# Patient Record
Sex: MALE | Race: WHITE
[De-identification: names, ages, dates, MRNs, and addresses within clinical notes are randomized per-mention and may not be internally consistent; named-entity substitution may affect disease eponyms.]

---

## 2020-06-17 ENCOUNTER — HOSPITAL ENCOUNTER (INPATIENT)
Dept: HOSPITAL 47 - EC | Age: 75
LOS: 2 days | Discharge: HOME | DRG: 282 | End: 2020-06-19
Attending: HOSPITALIST | Admitting: HOSPITALIST
Payer: MEDICARE

## 2020-06-17 DIAGNOSIS — Z87.891: ICD-10-CM

## 2020-06-17 DIAGNOSIS — Z79.82: ICD-10-CM

## 2020-06-17 DIAGNOSIS — Z87.01: ICD-10-CM

## 2020-06-17 DIAGNOSIS — E78.5: ICD-10-CM

## 2020-06-17 DIAGNOSIS — Z79.899: ICD-10-CM

## 2020-06-17 DIAGNOSIS — R51: ICD-10-CM

## 2020-06-17 DIAGNOSIS — Z95.1: ICD-10-CM

## 2020-06-17 DIAGNOSIS — Z82.49: ICD-10-CM

## 2020-06-17 DIAGNOSIS — I27.20: ICD-10-CM

## 2020-06-17 DIAGNOSIS — K21.9: ICD-10-CM

## 2020-06-17 DIAGNOSIS — Z83.3: ICD-10-CM

## 2020-06-17 DIAGNOSIS — I25.10: ICD-10-CM

## 2020-06-17 DIAGNOSIS — I21.4: Primary | ICD-10-CM

## 2020-06-17 DIAGNOSIS — J44.9: ICD-10-CM

## 2020-06-17 DIAGNOSIS — I08.1: ICD-10-CM

## 2020-06-17 DIAGNOSIS — Z11.59: ICD-10-CM

## 2020-06-17 DIAGNOSIS — I10: ICD-10-CM

## 2020-06-17 LAB
ALBUMIN SERPL-MCNC: 3.6 G/DL (ref 3.5–5)
ALP SERPL-CCNC: 43 U/L (ref 38–126)
ALT SERPL-CCNC: 25 U/L (ref 4–49)
ANION GAP SERPL CALC-SCNC: 6 MMOL/L
APTT BLD: 22 SEC (ref 22–30)
AST SERPL-CCNC: 31 U/L (ref 17–59)
BASOPHILS # BLD AUTO: 0 K/UL (ref 0–0.2)
BASOPHILS NFR BLD AUTO: 0 %
BUN SERPL-SCNC: 17 MG/DL (ref 9–20)
CALCIUM SPEC-MCNC: 8.7 MG/DL (ref 8.4–10.2)
CHLORIDE SERPL-SCNC: 107 MMOL/L (ref 98–107)
CK SERPL-CCNC: 173 U/L (ref 55–170)
CO2 SERPL-SCNC: 23 MMOL/L (ref 22–30)
D DIMER PPP FEU-MCNC: 0.72 MG/L FEU (ref ?–0.6)
EOSINOPHIL # BLD AUTO: 0.2 K/UL (ref 0–0.7)
EOSINOPHIL NFR BLD AUTO: 2 %
ERYTHROCYTE [DISTWIDTH] IN BLOOD BY AUTOMATED COUNT: 4.78 M/UL (ref 4.3–5.9)
ERYTHROCYTE [DISTWIDTH] IN BLOOD: 13.8 % (ref 11.5–15.5)
GLUCOSE SERPL-MCNC: 136 MG/DL (ref 74–99)
HCT VFR BLD AUTO: 45.4 % (ref 39–53)
HGB BLD-MCNC: 16.1 GM/DL (ref 13–17.5)
INR PPP: 0.9 (ref ?–1.2)
LYMPHOCYTES # SPEC AUTO: 1 K/UL (ref 1–4.8)
LYMPHOCYTES NFR SPEC AUTO: 14 %
MAGNESIUM SPEC-SCNC: 2 MG/DL (ref 1.6–2.3)
MCH RBC QN AUTO: 33.6 PG (ref 25–35)
MCHC RBC AUTO-ENTMCNC: 35.4 G/DL (ref 31–37)
MCV RBC AUTO: 95 FL (ref 80–100)
MONOCYTES # BLD AUTO: 0.5 K/UL (ref 0–1)
MONOCYTES NFR BLD AUTO: 6 %
NEUTROPHILS # BLD AUTO: 5.6 K/UL (ref 1.3–7.7)
NEUTROPHILS NFR BLD AUTO: 75 %
PLATELET # BLD AUTO: 171 K/UL (ref 150–450)
PLATELET # BLD AUTO: 176 K/UL (ref 150–450)
POTASSIUM SERPL-SCNC: 3.9 MMOL/L (ref 3.5–5.1)
PROT SERPL-MCNC: 6.4 G/DL (ref 6.3–8.2)
PT BLD: 9.7 SEC (ref 9–12)
SODIUM SERPL-SCNC: 136 MMOL/L (ref 137–145)
WBC # BLD AUTO: 7.4 K/UL (ref 3.8–10.6)

## 2020-06-17 PROCEDURE — 93306 TTE W/DOPPLER COMPLETE: CPT

## 2020-06-17 PROCEDURE — 83735 ASSAY OF MAGNESIUM: CPT

## 2020-06-17 PROCEDURE — 85025 COMPLETE CBC W/AUTO DIFF WBC: CPT

## 2020-06-17 PROCEDURE — 80061 LIPID PANEL: CPT

## 2020-06-17 PROCEDURE — 80048 BASIC METABOLIC PNL TOTAL CA: CPT

## 2020-06-17 PROCEDURE — 96376 TX/PRO/DX INJ SAME DRUG ADON: CPT

## 2020-06-17 PROCEDURE — 96375 TX/PRO/DX INJ NEW DRUG ADDON: CPT

## 2020-06-17 PROCEDURE — 99285 EMERGENCY DEPT VISIT HI MDM: CPT

## 2020-06-17 PROCEDURE — 85730 THROMBOPLASTIN TIME PARTIAL: CPT

## 2020-06-17 PROCEDURE — 80053 COMPREHEN METABOLIC PANEL: CPT

## 2020-06-17 PROCEDURE — 93005 ELECTROCARDIOGRAM TRACING: CPT

## 2020-06-17 PROCEDURE — 96366 THER/PROPH/DIAG IV INF ADDON: CPT

## 2020-06-17 PROCEDURE — 83690 ASSAY OF LIPASE: CPT

## 2020-06-17 PROCEDURE — 84484 ASSAY OF TROPONIN QUANT: CPT

## 2020-06-17 PROCEDURE — 71275 CT ANGIOGRAPHY CHEST: CPT

## 2020-06-17 PROCEDURE — 85049 AUTOMATED PLATELET COUNT: CPT

## 2020-06-17 PROCEDURE — 82550 ASSAY OF CK (CPK): CPT

## 2020-06-17 PROCEDURE — 71046 X-RAY EXAM CHEST 2 VIEWS: CPT

## 2020-06-17 PROCEDURE — 85379 FIBRIN DEGRADATION QUANT: CPT

## 2020-06-17 PROCEDURE — 85610 PROTHROMBIN TIME: CPT

## 2020-06-17 PROCEDURE — 93458 L HRT ARTERY/VENTRICLE ANGIO: CPT

## 2020-06-17 PROCEDURE — 90732 PPSV23 VACC 2 YRS+ SUBQ/IM: CPT

## 2020-06-17 PROCEDURE — 36415 COLL VENOUS BLD VENIPUNCTURE: CPT

## 2020-06-17 PROCEDURE — 70450 CT HEAD/BRAIN W/O DYE: CPT

## 2020-06-17 PROCEDURE — 83880 ASSAY OF NATRIURETIC PEPTIDE: CPT

## 2020-06-17 PROCEDURE — 96365 THER/PROPH/DIAG IV INF INIT: CPT

## 2020-06-17 RX ADMIN — ATORVASTATIN CALCIUM SCH MG: 80 TABLET, FILM COATED ORAL at 13:20

## 2020-06-17 RX ADMIN — METOPROLOL TARTRATE SCH MG: 50 TABLET, FILM COATED ORAL at 09:43

## 2020-06-17 RX ADMIN — METOPROLOL TARTRATE SCH MG: 50 TABLET, FILM COATED ORAL at 20:11

## 2020-06-17 RX ADMIN — LOSARTAN POTASSIUM SCH MG: 25 TABLET, FILM COATED ORAL at 20:11

## 2020-06-17 RX ADMIN — HEPARIN SODIUM SCH MLS/HR: 10000 INJECTION, SOLUTION INTRAVENOUS at 07:25

## 2020-06-17 NOTE — CT
EXAM:

  CT Head Without Intravenous Contrast

 

CLINICAL HISTORY:

  ITS.REASON CT Reason: ha

 

TECHNIQUE:

  Axial computed tomography images of the head/brain without intravenous 

contrast.  CTDI is 49 mGy and DLP is 1123 mGy-cm.  This CT exam was 

performed using one or more of the following dose reduction techniques: 

automated exposure control, adjustment of the mA and/or kV according to 

patient size, and/or use of iterative reconstruction technique.

 

COMPARISON:

  No relevant prior studies available.

 

FINDINGS:

  Brain:  No hemorrhage or mass effect.

  Ventricles:  No hydrocephalus.  Mild cerebral volume loss.

  Bones/joints:  Unremarkable.

  Soft tissues:  Unremarkable.

  Sinuses:  Unremarkable.

  Mastoid air cells:  Clear.

 

IMPRESSION:     

  No acute hemorrhage, hydrocephalus, or mass effect.

## 2020-06-17 NOTE — P.CRDCN
History of Present Illness


Consult date: 20


Requesting physician: Ty Wilder


Consult reason: non-Q-wave MI


Chief complaint: Chest pain


History of present illness: 


This is a 75-year-old  gentleman who follows with Dr. CHRISTIANA Montoya in the 

NECK OFFICE.  HE HAS A HISTORY OF CORONARY ARTERY DISEASE WITH PRIOR CORONARY 

ARTERY BYPASS GRAFTING SURGERY IN , HE UNDERWENT BYPASS SURGERY WITH A LIMA 

TO THE SECOND OBTUSE MARGINAL, VEIN GRAFT TO THE PDA BRANCH AND ANOTHER VEIN 

GRAFT TO THE PLV BRANCH OF THE RIGHT CORONARY ARTERY.  THE LAD WAS SMALL.  The 

patient also has a history of hypertension, hyperlipidemia, nicotine dependence,

family history of premature coronary artery disease.  He presents to the 

hospital on this occasion with fairly sudden onset of shortness of breath with 

seated chest pressure and heaviness, he states he felt like someone heavy was 

sitting on his chest.  He became quite clammy, also complained of a headache.  

Chest x-ray performed on arrival here showed some pleural and pulmonary scarring

to the left lung, no heart failure.  There is evidence of small bilateral 

pleural effusions.  EKG shows a sinus bradycardia with nonspecific ST-T wave 

changes noted in the lateral leads.  CTA of the chest negative for pulmonary 

embolism, patchy atelectasis and linear infiltrate at the lung bases with 

bilateral pleural effusions.  Nonspecific bronchial lymph nodes noted.  CAT scan

of the head did not reveal any acute hemorrhage, hydrocephalus, or mass effect. 

Blood pressure 170/90 with a heart rate in the 50s, 98% on 2 L of oxygen.  White

blood cell count 7.4, hemoglobin 16.1, platelet count 176.  Sodium 136, 

potassium 3.9, BUN 17, creatinine 0.9.  Magnesium 2.0.  D-dimer 0.72.  BNP 1450.

 Troponins 0.03, 0.05, 0.04.  At the time of my examination in the emergency 

room the patient is currently chest pain-free.








Past Medical History


Past Medical History: Coronary Artery Disease (CAD), Chest Pain / Angina, COPD, 

GERD/Reflux, Hyperlipidemia, Hypertension, Pneumonia


History of Any Multi-Drug Resistant Organisms: None Reported


Past Surgical History: Coronary Bypass/CABG, Heart Catheterization


Additional Past Surgical History / Comment(s):  triple bypass, R hand 

thumb/index and middle finger surgery twice d/t injury, nasal fracture with 

surgery, colonoscopy


Past Anesthesia/Blood Transfusion Reactions: No Reported Reaction


Smoking Status: Former smoker





- Past Family History


  ** Father


Family Medical History: Myocardial Infarction (MI)


Additional Family Medical History / Comment(s): Father had his first MI at the 

age of 47 and  from his 3rd MI at the age of 49.





  ** Mother


Family Medical History: Diabetes Mellitus


Additional Family Medical History / Comment(s): Tejar is .





Medications and Allergies


                                Home Medications











 Medication  Instructions  Recorded  Confirmed  Type


 


Aspirin [Adult Low Dose Aspirin EC] 81 mg PO DAILY 20 History


 


Cholecalciferol [Vitamin D3 (25 1,000 unit PO DAILY 20 History





Mcg = 1000 Iu)]    


 


Metoprolol Succinate (ER) [Toprol 100 mg PO HS 20 History





Xl]    


 


Potassium Chloride [Klor-Con 20] 20 meq PO DAILY 20 History


 


Pravastatin Sodium [Pravachol] 40 mg PO HS 20 History


 


hydrALAZINE HCL 50 mg PO BID 20 History








                                    Allergies











Allergy/AdvReac Type Severity Reaction Status Date / Time


 


No Known Allergies Allergy   Verified 20 07:53














Physical Exam


Vitals: 


                                   Vital Signs











  Temp Pulse Resp BP Pulse Ox


 


 20 09:42   56 L  18  170/90  98


 


 20 06:51   53 L  17  173/93  98


 


 20 06:03   55 L  17  177/90  99


 


 20 05:19   55 L  17  173/67  99


 


 20 04:45   53 L  17   99


 


 20 04:10   55 L  18  198/93  99


 


 20 03:45   64  17  171/84  99


 


 20 02:18  98.4 F  55 L  18  145/90  100








                                Intake and Output











 20





 22:59 06:59 14:59


 


Other:   


 


  Weight  86.636 kg 86.636 kg














PHYSICAL EXAMINATION: 





GENERAL: 75-year-old  gentleman in no acute distress at the time of my 

examination





HEENT: Head is atraumatic, normocephalic.  Pupils equal, round.  Sclera 

anicteric. Conjunctiva are clear.  Mucous membranes of the mouth are moist.  

Neck is supple.  There is no elevated jugular venous pressure.  No carotid  

bruit is heard.





HEART EXAMINATION: Heart S1, S2 normal.  No murmur or gallop heard.





CHEST EXAMINATION: Lungs reveal fine rales to the bases bilaterally





ABDOMEN:  Soft, nontender. Bowel sounds are heard. No organomegaly noted.


 


EXTREMITIES: 2+ peripheral pulses with no evidence of peripheral edema and no 

calf tenderness noted.





NEUROLOGIC patient is awake, alert and oriented 3 .


 


.


 








Results





                                 20 08:30





                                 20 02:27


                                 Cardiac Enzymes











  20 Range/Units





  02:27 02:27 05:17 


 


AST  31    (17-59)  U/L


 


Troponin I   0.033  0.051 H*  (0.000-0.034)  ng/mL














  20 Range/Units





  08:30 


 


AST   (17-59)  U/L


 


Troponin I  0.040 H*  (0.000-0.034)  ng/mL








                                   Coagulation











  20 Range/Units





  02:27 


 


PT  9.7  (9.0-12.0)  sec


 


APTT  22.0  (22.0-30.0)  sec








                                       CBC











  20 Range/Units





  02:27 08:30 


 


WBC  7.4   (3.8-10.6)  k/uL


 


RBC  4.78   (4.30-5.90)  m/uL


 


Hgb  16.1   (13.0-17.5)  gm/dL


 


Hct  45.4   (39.0-53.0)  %


 


Plt Count  176  171  (150-450)  k/uL








                          Comprehensive Metabolic Panel











  20 Range/Units





  02:27 


 


Sodium  136 L  (137-145)  mmol/L


 


Potassium  3.9  (3.5-5.1)  mmol/L


 


Chloride  107  ()  mmol/L


 


Carbon Dioxide  23  (22-30)  mmol/L


 


BUN  17  (9-20)  mg/dL


 


Creatinine  0.98  (0.66-1.25)  mg/dL


 


Glucose  136 H  (74-99)  mg/dL


 


Calcium  8.7  (8.4-10.2)  mg/dL


 


AST  31  (17-59)  U/L


 


ALT  25  (4-49)  U/L


 


Alkaline Phosphatase  43  ()  U/L


 


Total Protein  6.4  (6.3-8.2)  g/dL


 


Albumin  3.6  (3.5-5.0)  g/dL








                               Current Medications











Generic Name Dose Route Start Last Admin





  Trade Name Freq  PRN Reason Stop Dose Admin


 


Aspirin  81 mg  20 09:00 





  Aspirin  PO  





  DAILY [facility]  


 


Heparin Sodium (Porcine)  0 unit  20 04:10 





  Heparin  IV  





  Q6HR PRN  





  Low PTT  





  Protocol  


 


Heparin Sodium/Sodium Chloride  250 mls @ 9.53 mls/hr  20 04:15  20 

07:25





  25,000 unit/ Sodium Chloride  IV   11 units/kg/hr





  .Q24H VETO   9.53 mls/hr





    Administration





  Protocol  





  11 UNITS/KG/HR  


 


Losartan Potassium  25 mg  20 10:15 





  Cozaar  PO  





  DAILY [facility]  


 


Metoprolol Tartrate  50 mg  20 09:00  20 09:43





  Lopressor  PO   50 mg





  BID VETO   Administration


 


Nitroglycerin  0.4 mg  20 04:10 





  Nitrostat  SUBLINGUAL  





  Q5M PRN  





  Chest Pain  








                                Intake and Output











 20





 22:59 06:59 14:59


 


Other:   


 


  Weight  86.636 kg 86.636 kg








                                 Patient Weight











 20





 06:59


 


Weight 86.636 kg








                                        





                                 20 08:30 





                                 20 02:27 











EKG Interpretations (text)





EKG shows a sinus bradycardia with nonspecific ST-T wave changes noted in the 

lateral leads





Assessment and Plan


Plan: 


Assessment and plan


#1 symptoms of chest pressure and heaviness with associated shortness of breath 

and clamminess, clinical picture suggestive acute coronary syndrome.  EKG shows 

a sinus bradycardia with lateral ST-T wave changes.  Troponins 0.03, 0.05, 0.04.


#2 known history of coronary artery disease with prior bypass surgery in 


#3 hypertension


#4 hyperlipidemia


#5 family history of premature coronary artery disease 


#6 history of nicotine dependence





Plan


We will obtain an echocardiogram with Doppler study.  We will also add losartan 

to the patient's medication regime, to optimize blood pressure control.  

Continue aspirin, metoprolol, start the patient on a statin.  Patient has been 

advised to undergo cardiac catheterization, the risks and benefits were 

explained to the patient in detail.  Dr. Coates will speak with Dr. BRONWYN Montoya, we

will tentatively schedule patient for cardiac catheterization tomorrow.  Further

recommendations to follow.





DNP note has been reviewed, I agree with a documented findings and plan of care.

 Patient was seen and examined.

## 2020-06-17 NOTE — CT
EXAMINATION TYPE: CT angio chest

 

DATE OF EXAM: 6/17/2020

 

COMPARISON: None

 

HISTORY: Chest pain

 

CT DLP: 520.9 mGycm

Automated exposure control for dose reduction was used.

 

CONTRAST: 

Performed with IV Contrast, patient injected with 90 mL of Isovue 370.

 

There are 3-D post processed images.

 

There are mild to moderate bilateral pleural effusions. There is no pericardial effusion. There is so
me patchy atelectasis at the lung bases.

 

There is no mediastinal adenopathy. Thoracic aorta shows mild atheromatous change. There is no aneury
sm or dissection. There are bilateral bronchial lymph nodes that measure up to 1 cm.

 

There is normal contrast opacification of the pulmonary arteries. There are no filling defects. The b
mae thorax is intact. There are sternal wires. There is a 1 cm calcified gallstone. Bile ducts are no
t dilated. There is plaque formation in the lower abdominal aorta that measures up to 1 cm in thickne
ss. Abdominal aorta measures 2.8 cm.

 

IMPRESSION:

No evidence of pulmonary embolism. Patchy atelectasis and linear infiltrate at the lung bases with bi
lateral pleural effusions. Nonspecific bronchial lymph nodes.

## 2020-06-17 NOTE — P.HPIM
History of Present Illness


Patient is an 75-year-old gentleman came with complains of chest pain has been 

going on on and off for a while which is heaviness.  Patient just pain is 

nonpleuritic not associated with food has some associated shortness of breath de

nied any fever chills lightheadedness pain chest pain lasts anywhere between 1-3

hours episodic.  Patient had history of a previous CABG in the past in .  

Patient was evaluated by cardiology patient has minimally elevated troponins of 

0.04 and 0.05 and the recording cardiac catheterization same thing was discussed

the patient and patient is agreeable for cardiac catheterization CT angios the c

hest did not show any pulmonary embolism except for the bilateral pleural 

effusions.  Echocardiogram showed normal ejection fraction with some almost 

abdominal distress which can be older.  Patient had a CT brain CT which is did 

not show any acute abnormalities.  His BNP is only 40 and 50 and patient doesn't

have any elevated JVD





Review of Systems








REVIEW OF SYSTEMS: 


CONSTITUTIONAL: No fever, no malaise, no fatigue. 


HEENT: No recent visual problems or hearing problems. Denied any sore throat. 


CARDIOVASCULAR: No orthopnea, PND, no palpitations, no syncope. 


PULMONARY: No shortness of breath, no cough, no hemoptysis. 


GASTROINTESTINAL: No diarrhea, no nausea, no vomiting, no abdominal pain. 


NEUROLOGICAL: No headaches, no weakness, no numbness. 


HEMATOLOGICAL: Denies any bleeding or petechiae. 


GENITOURINARY: Denies any burning micturition, frequency, or urgency. 


MUSCULOSKELETAL/RHEUMATOLOGICAL: Denies any joint pain, swelling, or any muscle 

pain. 


ENDOCRINE: Denies any polyuria or polydipsia. 





The rest of the 14-point review of systems is negative.











Past Medical History


Past Medical History: Coronary Artery Disease (CAD), Chest Pain / Angina, COPD, 

GERD/Reflux, Hyperlipidemia, Hypertension, Pneumonia


History of Any Multi-Drug Resistant Organisms: None Reported


Past Surgical History: Coronary Bypass/CABG, Heart Catheterization


Additional Past Surgical History / Comment(s):  triple bypass, R hand 

thumb/index and middle finger surgery twice d/t injury, nasal fracture with 

surgery, colonoscopy


Past Anesthesia/Blood Transfusion Reactions: No Reported Reaction


Smoking Status: Former smoker





- Past Family History


  ** Father


Family Medical History: Myocardial Infarction (MI)


Additional Family Medical History / Comment(s): Father had his first MI at the 

age of 47 and  from his 3rd MI at the age of 49.





  ** Mother


Family Medical History: Diabetes Mellitus


Additional Family Medical History / Comment(s): Loulou is .





Medications and Allergies


                                Home Medications











 Medication  Instructions  Recorded  Confirmed  Type


 


Aspirin [Adult Low Dose Aspirin EC] 81 mg PO DAILY 20 History


 


Cholecalciferol [Vitamin D3 (25 1,000 unit PO DAILY 20 History





Mcg = 1000 Iu)]    


 


Metoprolol Succinate (ER) [Toprol 100 mg PO HS 20 History





Xl]    


 


Potassium Chloride [Klor-Con 20] 20 meq PO DAILY 20 History


 


Pravastatin Sodium [Pravachol] 40 mg PO HS 20 History


 


hydrALAZINE HCL 50 mg PO BID 20 History








                                    Allergies











Allergy/AdvReac Type Severity Reaction Status Date / Time


 


No Known Allergies Allergy   Verified 20 07:53














Physical Exam


Vitals: 


                                   Vital Signs











  Temp Pulse Resp BP Pulse Ox


 


 20 11:36  98.3 F  57 L  18  135/76  98


 


 20 09:42   56 L  18  170/90  98


 


 20 06:51   53 L  17  173/93  98


 


 20 06:03   55 L  17  177/90  99


 


 20 05:19   55 L  17  173/67  99


 


 20 04:45   53 L  17   99


 


 20 04:10   55 L  18  198/93  99


 


 20 03:45   64  17  171/84  99


 


 20 02:18  98.4 F  55 L  18  145/90  100








                                Intake and Output











 20





 22:59 06:59 14:59


 


Other:   


 


  Weight  86.636 kg 86.636 kg

















PHYSICAL EXAMINATION: 





GENERAL: The patient is alert and oriented x3, not in any acute distress. Well 

developed, well nourished. 


HEENT: Pupils are round and equally reacting to light. EOMI. No scleral icterus.

 No conjunctival pallor. Normocephalic, atraumatic. No pharyngeal erythema. No 

thyromegaly. 


CARDIOVASCULAR: S1 and S2 present. No murmurs, rubs, or gallops. 


PULMONARY: Chest is clear to auscultation, no wheezing or crackles. 


ABDOMEN: Soft, nontender, nondistended, normoactive bowel sounds. No palpable 

organomegaly. 


MUSCULOSKELETAL: No joint swelling or deformity.


EXTREMITIES: No cyanosis, clubbing, or pedal edema. 


NEUROLOGICAL: Gross neurological examination did not reveal any focal deficits. 


SKIN: No rashes. 

















Results


CBC & Chem 7: 


                                 20 08:30





                                 20 02:27


Labs: 


                  Abnormal Lab Results - Last 24 Hours (Table)











  20 Range/Units





  02:27 02:27 05:17 


 


D-Dimer  0.72 H    (<0.60)  mg/L FEU


 


Sodium   136 L   (137-145)  mmol/L


 


Glucose   136 H   (74-99)  mg/dL


 


Creatine Kinase   173 H   ()  U/L


 


Troponin I    0.051 H*  (0.000-0.034)  ng/mL














  20 Range/Units





  08:30 


 


D-Dimer   (<0.60)  mg/L FEU


 


Sodium   (137-145)  mmol/L


 


Glucose   (74-99)  mg/dL


 


Creatine Kinase   ()  U/L


 


Troponin I  0.040 H*  (0.000-0.034)  ng/mL














Thrombosis Risk Factor Assmnt





- Choose All That Apply


Any of the Below Risk Factors Present?: Yes


Each Factor Represents 1 point: Abnormal pulmonary function (COPD), Obesity (BMI

 >25)


Other Risk Factors: Yes


Each Risk Factor Represents 3 Points: Age 75 years or older


Other congenital or acquired thrombophilia - If yes, enter type in comment: No


Thrombosis Risk Factor Assessment Total Risk Factor Score: 5


Thrombosis Risk Factor Assessment Level: High Risk





Assessment and Plan


Plan: 





-Chest pain with mildly elevated troponins possibility of non-ST elevation 

myocardial infarction: Continue with heparin antiplatelet therapy beta blocker. 

 Will undergo cardiac catheterization tomorrow


-Coronary artery disease with history of CABG in the past 


- hypertension: Patient was started on ACE inhibitor for better blood pressure 

control by cardiology


- hyperlipidemia


-Ruled out PE and pneumonia


-Gastroesophageal reflux disease





For above-mentioned chronic problems patient will be resumed and continued on 

appropriate home medications

## 2020-06-17 NOTE — ECHOF
Referral Reason:assess lvf



MEASUREMENTS

--------

HEIGHT: 175.3 cm

WEIGHT: 86.6 kg

BP: 174/106

RVIDd:   3.1 cm     (< 3.3)

IVSd:   1.3 cm     (0.6 - 1.1)

LVIDd:   4.2 cm     (3.9 - 5.3)

LVPWd:   1.3 cm     (0.6 - 1.1)

IVSs:   1.7 cm

LVIDs:   3.0 cm

LVPWs:   2.0 cm

LA Diam:   3.7 cm     (2.7 - 3.8)

LAESV Index (A-L):   27.76 ml/m

Ao Diam:   3.3 cm     (2.0 - 3.7)

AV Cusp:   2.0 cm     (1.5 - 2.6)

MV EXCURSION:   24.295 mm     (> 18.000)

MV EF SLOPE:   18 mm/s     (70 - 150)

EPSS:   0.2 cm

MV E Bret:   1.04 m/s

MV DecT:   193 ms

MV A Bret:   0.61 m/s

MV E/A Ratio:   1.71 

RAP:   5.00 mmHg

RVSP:   34.99 mmHg







FINDINGS

--------

This was a technically adequate study.

HX of CABG

The left ventricular size is normal.   There is mild concentric left ventricular hypertrophy.   Overa
ll left ventricular systolic function is low-normal with, an EF between 50 - 55 %.   Basal inferior L
V wall motion is hypokinetic.    Basal inferoseptal LV wall motion is hypokinetic.

The right ventricle is normal in size.

Normal LA  size by volume 22+/-6 ml/m2.

The right atrium is normal in size.

Interatrial and interventricular septum intact.

The aortic valve is trileaflet and appears structurally normal.

Mild mitral annular calcification present.

Mild tricuspid regurgitation present.   There is mild pulmonary hypertension.   The right ventricular
 systolic pressure, as measured by Doppler, is 34.99mmHg.

There is no pulmonic regurgitation present.

The aortic root size is normal.

IVC Not well visulized.

There is no pericardial effusion.



CONCLUSIONS

--------

1. This was a technically adequate study.

2. HX of CABG

3. The left ventricular size is normal.

4. There is mild concentric left ventricular hypertrophy.

5. Overall left ventricular systolic function is low-normal with, an EF between 50 - 55 %.

6. Basal inferior LV wall motion is hypokinetic.

7. Basal inferoseptal LV wall motion is hypokinetic.

8. The right ventricle is normal in size.

9. Normal LA size by volume 22+/-6 ml/m2.

10. The right atrium is normal in size.

11. Interatrial and interventricular septum intact.

12. The aortic valve is trileaflet and appears structurally normal.

13. Mild mitral annular calcification present.

14. Mild tricuspid regurgitation present.

15. There is mild pulmonary hypertension.

16. The right ventricular systolic pressure, as measured by Doppler, is 34.99mmHg.

17. There is no pulmonic regurgitation present.

18. The aortic root size is normal.

19. IVC Not well visulized.

20. There is no pericardial effusion.





SONOGRAPHER: Jasmin Stauffer RDCS

## 2020-06-17 NOTE — ED
Chest Pain HPI





- General


Chief Complaint: Chest Pain


Stated Complaint: Chest pain


Time Seen by Provider: 20 02:22


Source: EMS, RN notes reviewed, old records reviewed


Mode of arrival: EMS


Limitations: no limitations





- History of Present Illness


Initial Comments: 





This is a 75-year-old male DF for evaluation patient has history of heart 

disease CABG complaining of chest pain as well as dizziness vertiginous symptoms

weakness especially with position, these are symptoms similar prior MI with 

recalled required surgery.  Patient states his symptoms are improved is on is 

still currently.  No recent fevers cough or congestion no current headache


MD Complaint: chest pain


-: hour(s)


Onset: during rest, during exertion


Pain Location: substernal, left chest


Pain Radiation: LUE


Severity: mild


Severity scale (1-10): 3


Quality: heaviness


Consistency: intermittent


Improves With: nothing


Worsens With: nothing


Anginal Symptoms: nausea


Treatments Prior to Arrival: none





- Related Data


                                Home Medications











 Medication  Instructions  Recorded  Confirmed


 


Aspirin [Adult Low Dose Aspirin EC] 81 mg PO DAILY 20


 


Cholecalciferol [Vitamin D3 (25 1,000 unit PO DAILY 20





Mcg = 1000 Iu)]   


 


Pravastatin Sodium [Pravachol] 40 mg PO HS 20








                                  Previous Rx's











 Medication  Instructions  Recorded


 


ALPRAZolam [Xanax] 0.5 mg PO Q6HR PRN #12 tab 20


 


Acetaminophen Tab [Tylenol] 650 mg PO Q6HR PRN  tab 20


 


Isosorbide Mononitrate ER [Imdur] 30 mg PO DAILY 30 Days #30 20





 tab.er.24h 


 


Losartan [Cozaar] 50 mg PO BID 30 Days #60 tab 20


 


Metoprolol Tartrate [Lopressor] 50 mg PO BID 30 Days #60 tab 20


 


Nitroglycerin Sl Tabs [Nitrostat] 0.4 mg SUBLINGUAL Q5M PRN #30 tab 20


 


hydrALAZINE HCL 50 mg PO TID 30 Days #90 tab 20











                                    Allergies











Allergy/AdvReac Type Severity Reaction Status Date / Time


 


No Known Allergies Allergy   Verified 20 07:53














Review of Systems


ROS Statement: 


Those systems with pertinent positive or pertinent negative responses have been 

documented in the HPI.





ROS Other: All systems not noted in ROS Statement are negative.





EKG Findings





- EKG Comments:


EKG Findings:: EKG shows bradycardia 54, , QRS 78, .  EKG shows 

sinus bradycardia 54  QRS 80 





Past Medical History


Past Medical History: Chest Pain / Angina, Hypertension


History of Any Multi-Drug Resistant Organisms: None Reported


Past Surgical History: Coronary Bypass/CABG


Additional Past Surgical History / Comment(s): triple bypass


Past Psychological History: No Psychological Hx Reported


Smoking Status: Never smoker


Past Alcohol Use History: None Reported


Past Drug Use History: None Reported





- Past Family History


  ** Father


Family Medical History: Myocardial Infarction (MI)


Additional Family Medical History / Comment(s): Father had his first MI at the 

age of 47 and  from his 3rd MI at the age of 49.





  ** Mother


Family Medical History: Diabetes Mellitus


Additional Family Medical History / Comment(s): Loulou is .





General Exam


General appearance: alert, in no apparent distress


Head exam: Present: atraumatic, normocephalic, normal inspection


Eye exam: Present: normal appearance, PERRL, EOMI.  Absent: scleral icterus, 

conjunctival injection, periorbital swelling


ENT exam: Present: normal exam, mucous membranes moist


Neck exam: Present: normal inspection.  Absent: tenderness, meningismus, 

lymphadenopathy


Respiratory exam: Present: normal lung sounds bilaterally.  Absent: respiratory 

distress, wheezes, rales, rhonchi, stridor


Cardiovascular Exam: Present: regular rate, normal rhythm, normal heart sounds. 

 Absent: systolic murmur, diastolic murmur, rubs, gallop, clicks


GI/Abdominal exam: Present: soft, normal bowel sounds.  Absent: distended, 

tenderness, guarding, rebound, rigid


Extremities exam: Present: normal inspection, full ROM, normal capillary refill.

  Absent: tenderness, pedal edema, joint swelling, calf tenderness


Back exam: Present: normal inspection


Neurological exam: Present: alert, oriented X3, CN II-XII intact


Psychiatric exam: Present: normal affect, normal mood


Skin exam: Present: warm, dry, intact, normal color.  Absent: rash





Course


                                   Vital Signs











  20





  02:18 03:45 04:10


 


Temperature 98.4 F  


 


Pulse Rate 55 L 64 55 L


 


Respiratory 18 17 18





Rate   


 


Blood Pressure 145/90 171/84 198/93


 


O2 Sat by Pulse 100 99 99





Oximetry   














  20





  04:45 05:19 06:03


 


Temperature   


 


Pulse Rate 53 L 55 L 55 L


 


Respiratory 17 17 17





Rate   


 


Blood Pressure  173/67 177/90


 


O2 Sat by Pulse 99 99 99





Oximetry   














  20





  06:51 09:42 11:36


 


Temperature   98.3 F


 


Pulse Rate 53 L 56 L 57 L


 


Respiratory 17 18 18





Rate   


 


Blood Pressure 173/93 170/90 135/76


 


O2 Sat by Pulse 98 98 98





Oximetry   














- Reevaluation(s)


Reevaluation #1: 





Medical records reviewed





Patient does have continued bouts of dizziness here in the ER which again he 

relates a prior MI








Chest Pain MDM





- MDM





75 male DF for evaluation has chest pain and dizziness, vertiginous symptoms 

similar prior MI CT brain negative CT angios for PE is negative for acute 

disease patient can be admitted for cardiology observation





Critical Care Time


Critical Care Time: Yes


Total Critical Care Time: 31





Disposition


Clinical Impression: 


 Chest pain





Disposition: ADMITTED AS IP TO THIS HOSP


Condition: Stable


Is patient prescribed a controlled substance at d/c from ED?: No

## 2020-06-17 NOTE — XR
EXAMINATION TYPE: XR chest 2V

 

DATE OF EXAM: 6/17/2020

 

COMPARISON: NONE

 

HISTORY: Left side pain

 

TECHNIQUE: 2 views

 

FINDINGS: There is some blunting of the left costophrenic angle. There is also slight blunting on the
 right side. There is some linear density in the left midlung field. Heart size is normal. There are 
no hilar masses. There are sternal wires. Bony thorax is intact.

 

IMPRESSION: There is some pleural and pulmonary scarring in the left lung. No heart failure seen. The
re is evidence of small bilateral pleural effusions.

## 2020-06-18 LAB
ANION GAP SERPL CALC-SCNC: 4 MMOL/L
BUN SERPL-SCNC: 17 MG/DL (ref 9–20)
CALCIUM SPEC-MCNC: 8 MG/DL (ref 8.4–10.2)
CHLORIDE SERPL-SCNC: 111 MMOL/L (ref 98–107)
CHOLEST SERPL-MCNC: 111 MG/DL (ref ?–200)
CO2 SERPL-SCNC: 23 MMOL/L (ref 22–30)
GLUCOSE SERPL-MCNC: 97 MG/DL (ref 74–99)
HDLC SERPL-MCNC: 35 MG/DL (ref 40–60)
LDLC SERPL CALC-MCNC: 53 MG/DL (ref 0–99)
PLATELET # BLD AUTO: 149 K/UL (ref 150–450)
POTASSIUM SERPL-SCNC: 4.1 MMOL/L (ref 3.5–5.1)
SODIUM SERPL-SCNC: 138 MMOL/L (ref 137–145)
TRIGL SERPL-MCNC: 116 MG/DL (ref ?–150)

## 2020-06-18 RX ADMIN — NITROGLYCERIN PRN MG: 0.4 TABLET SUBLINGUAL at 22:08

## 2020-06-18 RX ADMIN — NITROGLYCERIN PRN MG: 0.4 TABLET SUBLINGUAL at 13:46

## 2020-06-18 RX ADMIN — METOPROLOL TARTRATE SCH MG: 50 TABLET, FILM COATED ORAL at 20:07

## 2020-06-18 RX ADMIN — ATORVASTATIN CALCIUM SCH MG: 80 TABLET, FILM COATED ORAL at 09:17

## 2020-06-18 RX ADMIN — LOSARTAN POTASSIUM SCH MG: 25 TABLET, FILM COATED ORAL at 09:18

## 2020-06-18 RX ADMIN — NITROGLYCERIN PRN MG: 0.4 TABLET SUBLINGUAL at 21:31

## 2020-06-18 RX ADMIN — METOPROLOL TARTRATE SCH MG: 50 TABLET, FILM COATED ORAL at 09:17

## 2020-06-18 RX ADMIN — ASPIRIN 81 MG CHEWABLE TABLET SCH MG: 81 TABLET CHEWABLE at 09:18

## 2020-06-18 RX ADMIN — LOSARTAN POTASSIUM SCH MG: 25 TABLET, FILM COATED ORAL at 20:07

## 2020-06-18 RX ADMIN — HEPARIN SODIUM SCH MLS/HR: 10000 INJECTION, SOLUTION INTRAVENOUS at 03:42

## 2020-06-18 NOTE — PN
PROGRESS NOTE



Mr. Chapman is a 75-year-old male with a known history of coronary artery disease,

status post coronary artery bypass grafting, who presented with chest discomfort and

acute dyspnea.  He had minimal troponin elevation.  He is feeling well this morning.

He has no further chest pain.  His breathing has been stable.  He denies any dizziness,

palpitation. He continues to have dyspnea with exertion.  He had an echocardiogram that

revealed an ejection fraction of 50% to 55% with mild tricuspid regurgitation and

inferoseptal wall hypokinesis.



MEDICATION:

At this time continue to be aspirin once a day, Lipitor 80 mg daily, IV heparin,

hydralazine 50 mg 3 times a day, losartan 25 mg twice a day.



PHYSICAL EXAMINATION:

Blood pressure 129/60 with a heart rate in 60s.

LUNGS:  Clear.

HEART:  Regular rate and rhythm, S1, S2.  No S3 with systolic ejection murmur heard at

the base.  No diastolic murmur, no rub.

ABDOMEN:  Soft, nontender.

EXTREMITIES:  No edema.



LAB DATA:

Revealed BUN and creatinine 17 and 0.9, potassium 4.1.  His cholesterol is 111, LDL of

53.



IMPRESSION:

1. Non ST-segment elevation myocardial infarction.

2. Acute episode of dyspnea, probably ischemic.

3. Status post coronary artery bypass grafting.

4. History of hypertension.

5. History of hyperlipidemia, treated.

6. Prior history of smoking.



RECOMMENDATION:

I have recommend proceeding with coronary angiography to assess his status and guide

his treatment.  The patient was scheduled to undergo the procedure today, but because

of an emergency in the cath lab, the case will be scheduled for tomorrow with Dr. Montoya.  I have discussed those findings with the patient and he is in full

understanding and agreement.





MMODL / IJN: [id number] / Job#: [id number]

## 2020-06-19 VITALS
TEMPERATURE: 97.6 F | SYSTOLIC BLOOD PRESSURE: 177 MMHG | DIASTOLIC BLOOD PRESSURE: 77 MMHG | HEART RATE: 55 BPM | RESPIRATION RATE: 20 BRPM

## 2020-06-19 LAB
ANION GAP SERPL CALC-SCNC: 5 MMOL/L
BUN SERPL-SCNC: 18 MG/DL (ref 9–20)
CALCIUM SPEC-MCNC: 8 MG/DL (ref 8.4–10.2)
CHLORIDE SERPL-SCNC: 112 MMOL/L (ref 98–107)
CO2 SERPL-SCNC: 22 MMOL/L (ref 22–30)
GLUCOSE SERPL-MCNC: 103 MG/DL (ref 74–99)
PLATELET # BLD AUTO: 158 K/UL (ref 150–450)
POTASSIUM SERPL-SCNC: 3.7 MMOL/L (ref 3.5–5.1)
SODIUM SERPL-SCNC: 139 MMOL/L (ref 137–145)

## 2020-06-19 PROCEDURE — B2131ZZ FLUOROSCOPY OF MULTIPLE CORONARY ARTERY BYPASS GRAFTS USING LOW OSMOLAR CONTRAST: ICD-10-PCS

## 2020-06-19 PROCEDURE — B2111ZZ FLUOROSCOPY OF MULTIPLE CORONARY ARTERIES USING LOW OSMOLAR CONTRAST: ICD-10-PCS

## 2020-06-19 PROCEDURE — 4A023N7 MEASUREMENT OF CARDIAC SAMPLING AND PRESSURE, LEFT HEART, PERCUTANEOUS APPROACH: ICD-10-PCS

## 2020-06-19 RX ADMIN — LOSARTAN POTASSIUM SCH MG: 25 TABLET, FILM COATED ORAL at 05:16

## 2020-06-19 RX ADMIN — METOPROLOL TARTRATE SCH MG: 50 TABLET, FILM COATED ORAL at 05:16

## 2020-06-19 RX ADMIN — HEPARIN SODIUM SCH MLS/HR: 10000 INJECTION, SOLUTION INTRAVENOUS at 05:16

## 2020-06-19 RX ADMIN — ATORVASTATIN CALCIUM SCH MG: 80 TABLET, FILM COATED ORAL at 05:16

## 2020-06-19 RX ADMIN — ASPIRIN 81 MG CHEWABLE TABLET SCH MG: 81 TABLET CHEWABLE at 05:16

## 2020-06-19 NOTE — CC
CARDIAC CATHETERIZATION REPORT



DATE OF SERVICE:

06/19/2020.



PROCEDURE:

Left heart catheterization, coronary angiography and selective injection of bypass

grafts.



PERFORMED BY:

Dr. TANJA Montoay.



Moderate conscious sedation time was 45 minutes.  Patient was administered Versed.

Oxygen saturation, hemodynamics and EKG were monitored closely.



CLINICAL INFORMATION:

Mr. Angel Chapman is a 75-year-old gentleman with a history of CAD, hypertension, and

hyperlipidemia.  In 2012, he underwent a cardiac cath because of an acute inferior MI.

At that time, he was found to have a very atretic small LAD.  His circumflex came off

from the right cusp.  Circumflex had 3 different obtuse marginals.  The 3rd had a tight

stenosis.  RCA was a culprit lesion with a distal stenosis that was significant and was

the culprit lesion.  He had an intra-aortic balloon pump placement and a bypass surgery

with the LIMA to the third obtuse marginal and 2 separate vein grafts to the PLV branch

and PDA branch of RCA.  Because of symptoms suggestive of angina and mild troponin

elevation.  He was hospitalized after stabilizing him and ruling out a PE by CT angio.

He was advised cardiac catheterization.  Risks, benefits, options, rationale were

explained.



PROCEDURE NOTE:

Under strict aseptic precautions and local anesthesia, a 6-Mexican introducer was placed

in the right femoral artery.  Using a standard left catheter, I could not cannulate the

left coronary artery.  A 3.5 left Marie was used for the left coronary.  A Della

catheter was used for the right coronary artery and the vein graft to the PLV branch of

RCA.  The same Della was used for the LIMA injection.  I used an AR2 catheter for the

vein graft to the PDA branch of RCA.  A pigtail catheter was used to check LV pressure

but LV gram was not performed.  The sheath was taken out and a Perclose device used to

secure hemostasis and he was sent to the room in stable condition.



CARDIAC CATHETERIZATION FINDINGS:

The left ventricular end-diastolic pressure was about 14 mmHg without any gradient

across the aortic valve.



CORONARY ANGIOGRAPHY FINDINGS:

LEFT CORONARY ARTERY:  This is a single left coronary artery and it is small, atretic,

does not have any significant stenosis.  It is a small caliber vessel that immediately

divides into a diagonal branch and runs towards the mid portion of the anterior wall.

Limited amount of myocardium supplied by it.  No significant disease.



RIGHT CORONARY ARTERY:  This is a dominant vessel that comes off from the right cusp,

totally occluded in the midportion after a conus branch and 2 acute marginal branches.



LEFT CIRCUMFLEX CORONARY ARTERY:  This comes from the right cusp.  It runs towards the

left side across and gives off 3 obtuse marginals.  The third obtuse marginal has

competitive flow.  The 2 obtuse marginals have about 30% to 40% narrowing.  No

significant disease.



SAPHENOUS VEIN GRAFT TO THE PLV BRANCH OF RCA:  This graft is totally occluded, seen as

a stump.



SAPHENOUS VEIN GRAFT TO THE PDA BRANCH OF RCA:  This graft is widely patent, opacifies

the PDA and also goes back and fills the PLV.  No significant disease in the origin of

the body of the graft for insertion site and opacified PDA is of a fairly decent

caliber with minor disease and PLV is also opacified by this.



LEFT INTERNAL MAMMARY ARTERY GRAFT TO LAD:  This graft is widely patent.  Has no other

significant disease and its origin course or insertion site.  Opacified obtuse marginal

branch as well as a secondary branch are widely patent.



IMPRESSION:

This patient has acceptable filling pressures. no gradient across aortic valve.  A

right-dominant system with total occlusion of the RCA and significant disease in the

third obtuse marginal branch of circumflex which comes from the right cusp.  LAD is

small, has no significant disease. LIMA to the obtuse marginal is patent.  The vein

graft to the PLV is totally occluded, vein graft to the PDA is widely patent.  It also

fills the PLV branch of RCA.  Filling pressures are acceptable.  No gradient across the

aortic valve.



RECOMMENDATIONS:

I am recommending continued medical therapy with risk factor modification.  Patient can

be discharged later today or tomorrow.  I will increase losartan to 50 mg daily. add

Imdur 30 mg daily to his regimen.



The patient can be discharged later today or tomorrow.  Findings were discussed with

the patient and family.  I will see him in the office in one week.





MMODL / IJN: [id number] / Job#: [id number]

## 2020-06-19 NOTE — P.DS
Providers


Date of admission: 


06/19/20 08:27





Expected date of discharge: 06/19/20


Attending physician: 


Ty Wilder





Consults: 





                                        





06/17/20 04:10


Consult Physician Urgent 


   Consulting Provider: Joel Coates


   Consult Reason/Comments: cp


   Do you want consulting provider notified?: Yes











[facility] care physician[facility] 


[facility]





[facility] Course: 





Final diagnosis


-Chest pain with mildly elevated troponins possibility of non-ST elevation 

myocardial infarction


-Coronary artery disease with history of CABG in the past 


-hypertension


-hyperlipidemia


-Ruled out PE and pneumonia


-Gastroesophageal reflux disease





Discharge disposition


Patient is being discharged in a stable condition with guarded prognosis to 

home. Patient will follow-up with Dr. Padilla upon discharge.  Patient will also 

follow-up with cardiology Dr. Montoya in one week.  Total time taken is 35 

minutes.





History of present illness


This is an 75-year-old male who was recently admitted with chest pain and 

heaviness along with some shortness of breath and was being closely monitored.  

Patient does have a history of a CABG in 2012 and was found to have mildly 

elevated troponins upon admission.  Patient was initiated on IV heparin. Patient

was seen and evaluated by cardiology and underwent echo showing left ventricular

systolic function is low to normal with an EF between 50 and 55% along with mild

pulmonary hypertension, mild mitral and tricuspid regurgitation present, and 

some mild concentric left ventricular hypertrophy.  Patient underwent cardiac 

catheterization this morning showing acceptable filling pressures with no 

gradient across the aortic valve, a right dominant system with total occlusion 

of the RCA and significant disease in the third obtuse marginal branch of 

circumflex which comes from the right cusp, small LAD with no significant 

disease, LIMA to the obtuse marginal is patent, vein graft to the PLB is totally

occluded, vein graft to the PDA is widely patent and cardiology recommending 

continued medical therapy with risk factor modifications.  Patient's medications

have been adjusted and patient will be discharged home later today.  Indoor 30 

mg daily has been added to his daily regimen along with increase in losartan to 

50 mg daily.  Currently no reports of chest pain, shortness of breath, or 

palpitations.  Patient is afebrile.  No reports of nausea or vomiting and 

patient is tolerating diet.  Patient will be discharged home today.





On exam vital signs are stable.  Temp is 98.5F, pulse is 50, respirations are 

18, blood pressure is 113/63, oxygen saturation is 95 % on room air.  Cardio S1,

S2 are present.  Respiratory system shows clear to auscultation.  Abdomen is 

soft, mildly distended, and non-tender.  Nervous system shows no focal deficits.





Please refer to medication reconciliation sheet for a list of medications.





Patient Condition at Discharge: Stable





Plan - Discharge Summary


Discharge Rx Participant: No


New Discharge Prescriptions: 


New


   Losartan [Cozaar] 50 mg PO BID 30 Days #60 tab


   Isosorbide Mononitrate ER [Imdur] 30 mg PO DAILY 30 Days #30 tab.er.24h


   Metoprolol Tartrate [Lopressor] 50 mg PO BID 30 Days #60 tab


   Nitroglycerin Sl Tabs [Nitrostat] 0.4 mg SUBLINGUAL Q5M PRN #30 tab


     PRN Reason: Chest Pain


   Acetaminophen Tab [Tylenol] 650 mg PO Q6HR PRN  tab


     PRN Reason: Fever And/ Or Pain


   ALPRAZolam [Xanax] 0.5 mg PO Q6HR PRN #12 tab


     PRN Reason: Moderate Anxiety





Continue


   Pravastatin Sodium [Pravachol] 40 mg PO HS


   Cholecalciferol [Vitamin D3 (25 Mcg = 1000 Iu)] 1,000 unit PO DAILY


   Aspirin [Adult Low Dose Aspirin EC] 81 mg PO DAILY


   hydrALAZINE HCL 50 mg PO TID 30 Days #90 tab





Discontinued


   Potassium Chloride [Klor-Con 20] 20 meq PO DAILY


   hydrALAZINE HCL 50 mg PO BID


   Metoprolol Succinate (ER) [Toprol Xl] 100 mg PO HS


Discharge Medication List





Aspirin [Adult Low Dose Aspirin EC] 81 mg PO DAILY 06/17/20 [History]


Cholecalciferol [Vitamin D3 (25 Mcg = 1000 Iu)] 1,000 unit PO DAILY 06/17/20 

[History]


Pravastatin Sodium [Pravachol] 40 mg PO HS 06/17/20 [History]


ALPRAZolam [Xanax] 0.5 mg PO Q6HR PRN #12 tab 06/19/20 [Rx]


Acetaminophen Tab [Tylenol] 650 mg PO Q6HR PRN  tab 06/19/20 [Rx]


Isosorbide Mononitrate ER [Imdur] 30 mg PO DAILY 30 Days #30 tab.er.24h 06/19/20

 [Rx]


Losartan [Cozaar] 50 mg PO BID 30 Days #60 tab 06/19/20 [Rx]


Metoprolol Tartrate [Lopressor] 50 mg PO BID 30 Days #60 tab 06/19/20 [Rx]


Nitroglycerin Sl Tabs [Nitrostat] 0.4 mg SUBLINGUAL Q5M PRN #30 tab 06/19/20 

[Rx]


hydrALAZINE HCL 50 mg PO TID 30 Days #90 tab 06/19/20 [Rx]








Follow up Appointment(s)/Referral(s): 


Lj Montoya MD [STAFF PHYSICIAN] - 06/26/20 9:00 am


Shahid Padilla MD [Primary Care Provider] - 1-2 days


Patient Instructions/Handouts:  Chest Pain (ED)


Activity/Diet/Wound Care/Special Instructions: 


Activity Limited until follow-up


Continue current diet


Follow-up with primary care provider upon discharge


Follow-up with cardiology as discussed and scheduled in one week





Discharge Disposition: HOME SELF-CARE

## 2020-06-19 NOTE — P.PN
Subjective


Progress Note Date: 06/18/20


Principal diagnosis: 





Patient is an 75-year-old gentleman came with complains of chest pain has been 

going on on and off for a while which is heaviness.  Patient just pain is nonple

uritic not associated with food has some associated shortness of breath denied 

any fever chills lightheadedness pain chest pain lasts anywhere between 1-3 

hours episodic.  Patient had history of a previous CABG in the past in 2012.  

Patient was evaluated by cardiology patient has minimally elevated troponins of 

0.04 and 0.05 and the recording cardiac catheterization same thing was discussed

the patient and patient is agreeable for cardiac catheterization CT angios the 

chest did not show any pulmonary embolism except for the bilateral pleural 

effusions.  Echocardiogram showed normal ejection fraction with some almost 

abdominal distress which can be older.  Patient had a CT brain CT which is did 

not show any acute abnormalities.  His BNP is only 40 and 50 and patient doesn't

have any elevated JVD





06/18/2020 





Patient is seen and evaluated in follow up today and remains on the heparin drip

. Patient is to undergo cardiac catheterization Friday morning with Dr. Montoya. 

Patient currently has no chest pain, worsening shortness of breath, or 

palpitations. Patient is afebrile. No reports of nausea or vomiting and 

tolerating diet. Patient states he continues to have dyspnea with exertion even 

while getting up to walk to the bathroom he has to rest. Vital signs within 

normal limits. Patient is on room air. 








Objective





- Vital Signs


Vital signs: 


                                   Vital Signs











Temp  99.2 F   06/18/20 12:42


 


Pulse  65   06/18/20 12:42


 


Resp  22   06/18/20 12:42


 


BP  170/73   06/18/20 12:42


 


Pulse Ox  96   06/18/20 12:42








                                 Intake & Output











 06/17/20 06/18/20 06/18/20





 18:59 06:59 18:59


 


Intake Total 525.783 150.198 


 


Balance 525.783 150.198 


 


Weight 86.636 kg 89 kg 


 


Intake:   


 


  Intake, IV Titration 105.783 150.198 





  Amount   


 


    Heparin Sod,Pork in 0.45% 105.783 150.198 





    NaCl 25,000 unit In 0.45   





    % NaCl 1 250ml.bag @ 11   





    UNITS/KG/HR 9.53 mls/hr   





    IV .Q24H [facility] Rx#:   





    [id number]   


 


  Oral 420  


 


Other:   


 


  Voiding Method Toilet Toilet 


 


  # Voids 1 1 1














- Exam





GENERAL: The patient is alert and oriented x3, not in any acute distress. Well 

developed, well nourished. 


HEENT: Pupils are round and equally reacting to light. EOMI. No scleral icterus.

No conjunctival pallor. Normocephalic, atraumatic. No pharyngeal erythema. No 

thyromegaly. 


CARDIOVASCULAR: S1 and S2 present. No murmurs, rubs, or gallops. 


PULMONARY: Chest is clear to auscultation, no wheezing or crackles. 


ABDOMEN: Soft, nontender, mildly distended, normoactive bowel sounds. No 

palpable organomegaly. 


MUSCULOSKELETAL: No joint swelling or deformity.


EXTREMITIES: No cyanosis, clubbing, or pedal edema. 


NEUROLOGICAL: Gross neurological examination did not reveal any focal deficits. 


SKIN: No rashes. 





- Labs


CBC & Chem 7: 


                                 06/18/20 06:01





                                 06/18/20 06:01


Labs: 


                  Abnormal Lab Results - Last 24 Hours (Table)











  06/17/20 06/18/20 06/18/20 Range/Units





  13:57 00:27 06:01 


 


Plt Count    149 L  (150-450)  k/uL


 


APTT  34.1 H  58.4 H   (22.0-30.0)  sec


 


Chloride     ()  mmol/L


 


Calcium     (8.4-10.2)  mg/dL


 


HDL Cholesterol     (40-60)  mg/dL














  06/18/20 06/18/20 Range/Units





  06:01 06:01 


 


Plt Count    (150-450)  k/uL


 


APTT   47.6 H  (22.0-30.0)  sec


 


Chloride  111 H   ()  mmol/L


 


Calcium  8.0 L   (8.4-10.2)  mg/dL


 


HDL Cholesterol  35 L   (40-60)  mg/dL














Assessment and Plan


Assessment: 





-Chest pain with mildly elevated troponins possibility of non-ST elevation 

myocardial infarction: Continue with heparin drip  antiplatelet therapy beta 

blocker.  Will undergo cardiac catheterization tomorrow with Dr. Montoya


-Coronary artery disease with history of CABG in the past 


-hypertension: Patient was started on ACE inhibitor for better blood pressure 

control by cardiology


-hyperlipidemia


-Ruled out PE and pneumonia


-Gastroesophageal reflux disease